# Patient Record
Sex: FEMALE | Race: WHITE | Employment: UNEMPLOYED | URBAN - METROPOLITAN AREA
[De-identification: names, ages, dates, MRNs, and addresses within clinical notes are randomized per-mention and may not be internally consistent; named-entity substitution may affect disease eponyms.]

---

## 2018-06-22 LAB
LDL-C, EXTERNAL: 78
MICROALBUMIN UR TEST STR-MCNC: 4.9 MG/DL

## 2018-10-31 LAB
CREATININE, EXTERNAL: 0.94
HBA1C MFR BLD HPLC: 6.3 %

## 2018-12-03 ENCOUNTER — OFFICE VISIT (OUTPATIENT)
Dept: FAMILY MEDICINE CLINIC | Age: 73
End: 2018-12-03

## 2018-12-03 VITALS
TEMPERATURE: 98.9 F | OXYGEN SATURATION: 94 % | HEIGHT: 68 IN | RESPIRATION RATE: 16 BRPM | WEIGHT: 234 LBS | BODY MASS INDEX: 35.46 KG/M2 | HEART RATE: 81 BPM | SYSTOLIC BLOOD PRESSURE: 117 MMHG | DIASTOLIC BLOOD PRESSURE: 79 MMHG

## 2018-12-03 DIAGNOSIS — M79.601 RIGHT ARM PAIN: ICD-10-CM

## 2018-12-03 DIAGNOSIS — J01.00 ACUTE MAXILLARY SINUSITIS, RECURRENCE NOT SPECIFIED: Primary | ICD-10-CM

## 2018-12-03 RX ORDER — AZELASTINE HYDROCHLORIDE 0.5 MG/ML
SOLUTION/ DROPS OPHTHALMIC
Refills: 2 | COMMUNITY
Start: 2018-09-18

## 2018-12-03 RX ORDER — TRAMADOL HYDROCHLORIDE 50 MG/1
TABLET ORAL
Refills: 0 | COMMUNITY
Start: 2018-11-11

## 2018-12-03 RX ORDER — DOXYCYCLINE 100 MG/1
100 TABLET ORAL 2 TIMES DAILY
Qty: 14 TAB | Refills: 0 | Status: SHIPPED | OUTPATIENT
Start: 2018-12-03 | End: 2018-12-10

## 2018-12-03 RX ORDER — INSULIN LISPRO 100 [IU]/ML
INJECTION, SOLUTION INTRAVENOUS; SUBCUTANEOUS
COMMUNITY

## 2018-12-03 RX ORDER — BLOOD-GLUCOSE METER
KIT MISCELLANEOUS
Refills: 3 | COMMUNITY
Start: 2018-11-06

## 2018-12-03 RX ORDER — SENNOSIDES 8.6 MG/1
TABLET ORAL
Refills: 0 | COMMUNITY
Start: 2018-11-11 | End: 2018-12-03

## 2018-12-03 RX ORDER — VITAMIN E 1000 UNIT
CAPSULE ORAL
COMMUNITY

## 2018-12-03 RX ORDER — LEVOTHYROXINE SODIUM 150 UG/1
150 CAPSULE ORAL
COMMUNITY

## 2018-12-03 RX ORDER — VITAMIN E CAP 100 UNIT 100 UNIT
400 CAP ORAL
COMMUNITY

## 2018-12-03 RX ORDER — MELOXICAM 7.5 MG/1
7.5 TABLET ORAL
Qty: 14 TAB | Refills: 0 | Status: SHIPPED | OUTPATIENT
Start: 2018-12-03

## 2018-12-03 RX ORDER — METOPROLOL TARTRATE 50 MG/1
TABLET ORAL DAILY
COMMUNITY

## 2018-12-03 RX ORDER — METOPROLOL TARTRATE 25 MG/1
50 TABLET, FILM COATED ORAL DAILY
COMMUNITY
End: 2018-12-03 | Stop reason: SDUPTHER

## 2018-12-03 RX ORDER — METOCLOPRAMIDE 5 MG/1
TABLET ORAL
Refills: 0 | COMMUNITY
Start: 2018-11-10 | End: 2018-12-03

## 2018-12-03 RX ORDER — DOCUSATE SODIUM 100 MG/1
CAPSULE, LIQUID FILLED ORAL
Refills: 0 | COMMUNITY
Start: 2018-11-11 | End: 2018-12-03

## 2018-12-03 NOTE — PROGRESS NOTES
[facility]    [facility] [place]   [address]  Phone:  [phone]  Fax:  [phone]    Name: Diego Chinchilla  :    MRN:  [id number]  Encounter Date:  12/3/2018      History of Present Illness:  Diego Chinchilla is a 68 y.o. female. She is here for \"cold\" and right arm pain. Sinus Pain  Patient complains of congestion and facial pain. Symptoms include congestion, cough described as non-productive, without wheezing, dyspnea or hemoptysis and facial pain with no fever, chills, or night sweats. Onset of symptoms was 3 days ago, unchanged since that time. She is drinking plenty of fluids. .  Past history is significant for COPD. Patient is former smoker, quit 16 years ago    She notes right arm pain for several years that has worsened over the past few days. The pain radiates from her neck and shoulder. She denies weakness. She denies paresthesias. She has tried Advil which helps some. She has osteoarthritis in her knees. Allergies   Allergen Reactions    Latex Rash    Ace Inhibitors Other (comments) and Cough     Not described    Band Aid  [Adhesive] Rash    Peanut Nausea and Vomiting    Penicillins Swelling and Hives    Phenobarbital Other (comments)     \"foggy\"    Prednisone Other (comments)     Severe depression/ psychosis     Current Outpatient Medications   Medication Sig Dispense Refill    ascorbic acid, vitamin C, (VITAMIN C) 1,000 mg tablet DAILY      FREESTYLE LITE STRIPS strip CHECK BLOOD SUGAR 3 TIMES A DAY  3    insulin glargine U-300 conc 300 unit/mL (1.5 mL) inpn DAILY      insulin lispro (HUMALOG) 100 unit/mL kwikpen AS DIRECTED      SITagliptin-metFORMIN 100-1,000 mg TM24 DAILY AM for DIABETES      Levothyroxine 150 mcg cap 150 mcg.  metoprolol tartrate (LOPRESSOR) 50 mg tablet Take  by mouth daily.  doxycycline (ADOXA) 100 mg tablet Take 1 Tab by mouth two (2) times a day for 7 days.  14 Tab 0    meloxicam (MOBIC) 7.5 mg tablet Take 1 Tab by mouth daily as needed for Pain. 14 Tab 0    losartan-hydrochlorothiazide (HYZAAR) 100-25 mg per tablet Take 1 Tab by mouth daily.  coenzyme q10 10 mg Cap Take  by mouth.  cyanocobalamin (VITAMIN B-12) 1,000 mcg tablet Take 1,000 mcg by mouth daily.  aspirin 81 mg tablet Take 81 mg by mouth.  amLODIPine (NORVASC) 5 mg tablet Take 5 mg by mouth daily.  glipiZIDE (GLUCOTROL) 5 mg tablet Take  by mouth nightly.  atorvastatin (LIPITOR) 40 mg tablet Take  by mouth daily.  bumetanide (BUMEX) 2 mg tablet Take 1 mg by mouth daily.  Cholecalciferol, Vitamin D3, (VITAMIN D3) 1,000 unit Cap Take  by mouth.  pantoprazole (PROTONIX) 40 mg delayed release oral suspension 40 mg daily.  azelastine (OPTIVAR) 0.05 % ophthalmic solution INSTILL 1 DROP INTO BOTH EYES TWICE A DAY AS NEEDED  2    traMADol (ULTRAM) 50 mg tablet TAKE 1 TABLET BY MOUTH TWICE A DAY FOR PAIN  0    omega-3-dha-epa-dpa-fish oil 1,050-1,200 mg cap 1 g.      vitamin e (E GEMS) 100 unit capsule 400 Int'l Units.  metFORMIN (GLUCOPHAGE) 500 mg tablet Take  by mouth two (2) times daily (with meals).         gabapentin (NEURONTIN) 300 mg capsule Take as directed 90 Cap 1     Patient Active Problem List   Diagnosis Code    Diabetes mellitus ([facility] [address] E11.9    Neuropathy G62.9    Back pain, subacute M54.9    Hip pain, left M25.552       Past Medical History:   Diagnosis Date    Arthritis     Cataract of both eyes     Diabetes mellitus     type 2    Diabetic neuropathy ([facility] [address]     DVT (deep venous thrombosis) (HCC)     Essential hypertension     Fatty liver     normal LFT's    GERD (gastroesophageal reflux disease)     High cholesterol     History of shingles     Hx of measles     Hx of melanoma of skin     Hx of mumps     Hx of scarlet fever     Hypertension     Hypothyroidism     Pterygium of right eye     Sleep apnea     Thyroid disease      Past Surgical History:   Procedure Laterality Date    ENDOMETRIAL CRYOABLATION      fibroid cyst removal    HX BREAST BIOPSY      Left, fibrocystic change    HX BREAST LUMPECTOMY  2015    right, shikha ressection    HX CATARACT REMOVAL      HX COLONOSCOPY      HX TONSIL AND ADENOIDECTOMY      HX TUBAL LIGATION      MA COLSC FLX W/NDSC US XM RCTM ET AL LMTD&ADJ [address] [facility]  ,      Social History     Socioeconomic History    Marital status:      Spouse name: Not on file    Number of children: Not on file    Years of education: Not on file    Highest education level: Not on file   Tobacco Use    Smoking status: Former Smoker     Types: Cigarettes     Last attempt to quit:      Years since quittin.9    Smokeless tobacco: Never Used   Substance and Sexual Activity    Alcohol use: Yes     Alcohol/week: 4.8 oz     Types: 8 Glasses of wine per week     Frequency: 4 or more times a week     Drinks per session: 1 or 2    Drug use: No     Family History   Problem Relation Age of Onset    Hypertension Father     Stroke Father         hemorrhagic    Uterine Cancer Maternal Grandmother     Breast Cancer Maternal Aunt          Review of Systems   Constitutional: Negative for chills and fever. HENT: Positive for congestion and sinus pain (right maxillary). Negative for ear discharge, ear pain and sore throat. Eyes: Negative for discharge and redness. Respiratory: Positive for cough. Negative for sputum production, shortness of breath and wheezing. Cardiovascular: Negative for chest pain. Gastrointestinal: Negative for abdominal pain, diarrhea, nausea and vomiting. Musculoskeletal:        See HPI   Neurological: Negative for dizziness, sensory change and focal weakness.        Physical Exam:  Visit Vitals  /79 (BP 1 Location: Left arm, BP Patient Position: Sitting)   Pulse 81   Temp 98.9 °F (37.2 °C) (Oral)   Resp 16   Ht 5' 8\" (1.727 m)   Wt 234 lb (106.1 kg)   SpO2 94% BMI 35.58 kg/m²     Physical Exam   Constitutional: No distress. HENT:   Right Ear: Tympanic membrane normal.   Left Ear: Tympanic membrane normal.   Nose: Rhinorrhea present. Right sinus exhibits maxillary sinus tenderness. Right sinus exhibits no frontal sinus tenderness. Left sinus exhibits no maxillary sinus tenderness and no frontal sinus tenderness. Mouth/Throat: Oropharynx is clear and moist. Mucous membranes are not dry. No oropharyngeal exudate. Cardiovascular: Normal rate, regular rhythm and normal heart sounds. Pulmonary/Chest: Effort normal and breath sounds normal. No respiratory distress. Musculoskeletal: Normal range of motion. She exhibits tenderness (right paraspinal cervical muscles). She exhibits no edema or deformity. Cervical back: She exhibits tenderness (right paraspinal muscles). She exhibits normal range of motion, no bony tenderness, no swelling, no edema, no deformity and no laceration. Right upper arm: She exhibits no tenderness, no bony tenderness, no swelling, no edema, no deformity and no laceration. Right forearm: She exhibits no tenderness, no bony tenderness, no swelling, no edema, no deformity and no laceration. Lymphadenopathy:     She has no cervical adenopathy. Neurological: She has normal strength. Reflex Scores:       Tricep reflexes are 2+ on the right side and 2+ on the left side. Bicep reflexes are 2+ on the right side and 2+ on the left side. Brachioradialis reflexes are 2+ on the right side and 2+ on the left side. Reviewed: Medications, allergies, clinical lab test results and imaging results have been reviewed. Any abnormal findings have been addressed. Assessment/Plan:  1. Acute maxillary sinusitis, recurrence not specified  With history of COPD, will treat with doxycycline.   - doxycycline (ADOXA) 100 mg tablet; Take 1 Tab by mouth two (2) times a day for 7 days. Dispense: 14 Tab; Refill: 0    2.  Right arm pain  Likely cervical spine arthritis. Will treat with Mobic and if no improvement in 4 weeks, will check cspine xray. - meloxicam (MOBIC) 7.5 mg tablet; Take 1 Tab by mouth daily as needed for Pain. Dispense: 14 Tab; Refill: 0. Take with food. Follow-up Disposition:  Return in about 4 weeks (around 12/31/2018). Diagnoses, tests, plan, and follow up discussed with patient. I have given to and reviewed with the patient the after visit summary. I have reviewed medication side effects and precautions. Patient expressed agreement and understanding. All questions were answered. Discussed with Dr. Judie Samayoat who agrees.

## 2018-12-03 NOTE — PATIENT INSTRUCTIONS
Meloxicam (Comfort Pac with Meloxicam, Mobic, Trepoxicam-7.5) - (By mouth)   Why this medicine is used:   Treats symptoms of osteoarthritis (OA) and rheumatoid arthritis (RA). Contact a nurse or doctor right away if you have:  · Change in how much or how often you urinate  · Severe stomach pain, vomiting blood, bloody or black tarry stools  · Swelling in your hands, ankles, or feet; rapid weight gain     Common side effects:  · Nausea, diarrhea  · Mild skin rash  · Headache  © 2017 [facility] Information is for End User's use only and may not be sold, redistributed or otherwise used for commercial purposes. Doxycycline (By mouth)   Doxycycline ([id number]-p-UHU-kleglenn)  Treats and prevents infections. Also used to prevent malaria and treat rosacea or severe acne. This medicine is a tetracycline antibiotic. Brand Name(s): Acticlate, Adoxa, Adoxa Rehan 1/150, Avidoxy, Avidoxy DK, BenzoDox 30 Kit, BenzoDox 60 Kit, Doryx, Doryx MPC, Monodox, Morgidox [id number], Morgidox [id number] Kit, Morgidox 1x50MG, Morgidox 1x50MG Kit, Morgidox [id number]   There may be other brand names for this medicine. When This Medicine Should Not Be Used: This medicine is not right for everyone. Do not use it if you had an allergic reaction to doxycycline or another tetracycline antibiotic, or if you are pregnant or breastfeeding. How to Use This Medicine:   Capsule, Delayed Release Capsule, Long Acting Capsule, Liquid, Tablet, Delayed Release Tablet  · Your doctor will tell you how much medicine to use. Do not use more than directed. · Ask your pharmacist or doctor if you need to take this medicine with or without food. Some forms can be taken with food or milk, but others must be taken on an empty stomach. · Oracea® capsules: This medicine must be taken on an empty stomach, at least 1 hour before or 2 hours after a meal.  · Capsule: Swallow whole. Do not break, crush, chew, or open it. · Delayed-release tablets:  You may also take this medicine by sprinkling the broken tablets onto room-temperature applesauce. Swallow this mixture right away. Do not chew it. Do not store the mixture for later use. You may take this medicine with food or milk to avoid stomach upset. · Oral liquid: Shake the bottle well just before each use. Measure the oral liquid medicine with a marked measuring spoon, oral syringe, or medicine cup. · Tablets: You may take this medicine with food or milk to avoid stomach irritation. To break a tablet, hold the tablet between your thumb and index fingers close to the appropriate scored line. Then, apply enough pressure to snap the tablet segments apart. Do not use the tablet if it does not break on the scored lines. · Take all of the medicine in your prescription to clear up your infection, even if you feel better after the first few doses. · Drink plenty of fluids to avoid throat problems, if you take the capsule or tablet form. · Malaria prevention: Start taking the medicine 1 or 2 days before you travel. Take the medicine every day during your trip. Keep taking it for 4 weeks after you return. However, do not use the medicine for longer than 4 months. · Do not use this medicine for more than 9 months if you are using it for rosacea. · Use only the brand of medicine your doctor prescribed. Other brands may not work the same way. · Read and follow the patient instructions that come with this medicine. Talk to your doctor or pharmacist if you have any questions. · Missed dose: Take a dose as soon as you remember. If it is almost time for your next dose, wait until then and take a regular dose. Do not take extra medicine to make up for a missed dose. · Store the medicine in a closed container at room temperature, away from heat, moisture, and direct light. Do not freeze the oral liquid.   Drugs and Foods to Avoid:   Ask your doctor or pharmacist before using any other medicine, including over-the-counter medicines, vitamins, and herbal products. · Some medicines can affect how doxycycline works. Tell your doctor if you are using any of the following:  ¨ Bismuth subsalicylate  ¨ Acne medicines (including isotretinoin)  ¨ Birth control pills  ¨ Blood thinner (including warfarin)  ¨ Medicine for seizures (including carbamazepine, phenobarbital, phenytoin)  ¨ Medicine that contains aluminum, calcium, or iron (including an antacid or vitamin supplement)  ¨ Medicine to treat psoriasis (including acitretin)  ¨ Penicillin antibiotic  ¨ Stomach medicine  Warnings While Using This Medicine:   · This medicine may cause birth defects if either partner is using it during conception or pregnancy. Tell your doctor right away if you or your partner becomes pregnant. Birth control pills may not work as well when used with this medicine. Use a second form of birth control to keep from getting pregnant. · Tell your doctor if you have kidney disease, liver disease, asthma, or an allergy to sulfites. Tell your doctor if you had surgery on your stomach, or if you have a history of yeast infections. · This medicine may cause the following problems:  ¨ Permanent change in tooth color (in children younger than 6years old)  ¨ Increased pressure inside the head  ¨ Yeast infection  ¨ Immune system problems  · This medicine can cause diarrhea. Call your doctor if the diarrhea becomes severe, does not stop, or is bloody. Do not take any medicine to stop diarrhea until you have talked to your doctor. Diarrhea can occur 2 months or more after you stop taking this medicine. · This medicine may make your skin more sensitive to sunlight. Wear sunscreen. Do not use sunlamps or tanning beds. · Tell any doctor or dentist who treats you that you are using this medicine. This medicine may affect certain medical test results. · Call your doctor if your symptoms do not improve or if they get worse.   · Your doctor will do lab tests at regular visits to check on the effects of this medicine. Keep all appointments. · Keep all medicine out of the reach of children. Never share your medicine with anyone. Possible Side Effects While Using This Medicine:   Call your doctor right away if you notice any of these side effects:  · Allergic reaction: Itching or hives, swelling in your face or hands, swelling or tingling in your mouth or throat, chest tightness, trouble breathing  · Blistering, peeling, red skin rash  · Burning, pain, or irritation in your upper stomach or throat  · Diarrhea that may contain blood  · Fever, chills, cough, runny or stuffy nose, sore throat, body aches  · Joint pain, fever, rash, and unusual tiredness or weakness  · Severe headache, dizziness, vision changes  · Sudden and severe stomach pain, nausea, vomiting, lightheadedness  If you notice these less serious side effects, talk with your doctor:   · Darkening of your skin, scars, teeth, or gums  · Sores or white patches on your lips, mouth, or throat  If you notice other side effects that you think are caused by this medicine, tell your doctor. Call your doctor for medical advice about side effects. You may report side effects to FDA at 2-109-FDA-7051  © 2017 [address] [employer] Information is for End User's use only and may not be sold, redistributed or otherwise used for commercial purposes. The above information is an  only. It is not intended as medical advice for individual conditions or treatments. Talk to your doctor, nurse or pharmacist before following any medical regimen to see if it is safe and effective for you.

## 2018-12-04 NOTE — PROGRESS NOTES
I have reviewed the notes, assessments, and/or procedures performed, I concur with the residents documentation of [place].

## 2021-02-15 ENCOUNTER — TRANSCRIBE ORDER (OUTPATIENT)
Dept: SCHEDULING | Age: 76
End: 2021-02-15

## 2021-02-15 DIAGNOSIS — M17.12 PRIMARY LOCALIZED OSTEOARTHRITIS OF LEFT KNEE: ICD-10-CM

## 2021-02-15 DIAGNOSIS — M17.11 PRIMARY OSTEOARTHRITIS OF RIGHT KNEE: Primary | ICD-10-CM

## 2021-02-17 ENCOUNTER — HOSPITAL ENCOUNTER (OUTPATIENT)
Dept: CT IMAGING | Age: 76
Discharge: HOME | End: 2021-02-17
Attending: ORTHOPAEDIC SURGERY
Payer: MEDICARE

## 2021-02-17 DIAGNOSIS — M17.12 PRIMARY LOCALIZED OSTEOARTHRITIS OF LEFT KNEE: ICD-10-CM

## 2021-02-17 PROCEDURE — 73700 CT LOWER EXTREMITY W/O DYE: CPT

## 2023-05-19 RX ORDER — TRAMADOL HYDROCHLORIDE 50 MG/1
TABLET ORAL
COMMUNITY
Start: 2018-11-11

## 2023-05-19 RX ORDER — GLIPIZIDE 5 MG/1
TABLET ORAL
COMMUNITY

## 2023-05-19 RX ORDER — PANTOPRAZOLE SODIUM 40 MG/1
40 FOR SUSPENSION ORAL DAILY
COMMUNITY

## 2023-05-19 RX ORDER — ATORVASTATIN CALCIUM 40 MG/1
TABLET, FILM COATED ORAL DAILY
COMMUNITY

## 2023-05-19 RX ORDER — INSULIN LISPRO 100 [IU]/ML
INJECTION, SOLUTION INTRAVENOUS; SUBCUTANEOUS
COMMUNITY

## 2023-05-19 RX ORDER — MELOXICAM 7.5 MG/1
7.5 TABLET ORAL DAILY PRN
COMMUNITY
Start: 2018-12-03

## 2023-05-19 RX ORDER — GABAPENTIN 300 MG/1
CAPSULE ORAL
COMMUNITY
Start: 2012-07-16

## 2023-05-19 RX ORDER — LEVOTHYROXINE SODIUM 13 UG/1
150 CAPSULE ORAL
COMMUNITY

## 2023-05-19 RX ORDER — EVENING PRIMROSE OIL 500 MG
400 CAPSULE ORAL
COMMUNITY

## 2023-05-19 RX ORDER — METOPROLOL TARTRATE 50 MG/1
TABLET, FILM COATED ORAL DAILY
COMMUNITY

## 2023-05-19 RX ORDER — AZELASTINE HYDROCHLORIDE 0.5 MG/ML
SOLUTION/ DROPS OPHTHALMIC
COMMUNITY
Start: 2018-09-18

## 2023-05-19 RX ORDER — AMLODIPINE BESYLATE 5 MG/1
5 TABLET ORAL DAILY
COMMUNITY

## 2023-05-19 RX ORDER — BUMETANIDE 2 MG/1
1 TABLET ORAL DAILY
COMMUNITY

## 2023-05-19 RX ORDER — LOSARTAN POTASSIUM AND HYDROCHLOROTHIAZIDE 25; 100 MG/1; MG/1
1 TABLET ORAL DAILY
COMMUNITY

## 2024-06-10 ENCOUNTER — HOSPITAL ENCOUNTER (OUTPATIENT)
Facility: HOSPITAL | Age: 79
Discharge: HOME | End: 2024-06-13
Payer: MEDICARE

## 2024-06-10 ENCOUNTER — TRANSCRIBE ORDERS (OUTPATIENT)
Facility: HOSPITAL | Age: 79
End: 2024-06-10

## 2024-06-10 DIAGNOSIS — R06.02 SHORTNESS OF BREATH: Primary | ICD-10-CM

## 2024-06-10 DIAGNOSIS — R06.02 SHORTNESS OF BREATH: ICD-10-CM

## 2024-06-10 PROCEDURE — 71046 X-RAY EXAM CHEST 2 VIEWS: CPT
